# Patient Record
(demographics unavailable — no encounter records)

---

## 2024-10-18 NOTE — HISTORY OF PRESENT ILLNESS
[FreeTextEntry1] : [id number] ALEJANDRO BARRETO Feb 1 1938 [phone]  87 y/o  *CAD-Calcium score 2952-MPI Feb '23 normal perfusion. *RUE prox subclavian stenosis causing 30 mmHg reduction of BP on R arm. *CVA April '22-acute L frontoparielt infarct-***s/p ILR *Carotid disease-mild-50-69% R ICA *HTN *PE, L leg DVT Nov '21 on eliquis, DM, COPD, Parkinson's,  here for followup. slight worsening of dyspnea no chest pain, palpitaitons. son notifies me PCP increased jardiance to 25 mg daily.  BPs well controlled 110s-120s.  LDL  18 HDL 47 TC 81 TG 71  amlodipine 10 mg daily chlorthal. 12.5 mg daily  hydralazine 25 TID bypstolic 20 mg daily jardiance 25 daily eliquis 5 mg BID crestor 40 qhs

## 2024-10-18 NOTE — HISTORY OF PRESENT ILLNESS
[FreeTextEntry1] : [id number] ALEJANDRO BARRETO Feb 1 1938 [phone]  87 y/o  *CAD-Calcium score 2952-MPI Feb '23 normal perfusion. *RUE prox subclavian stenosis causing 30 mmHg reduction of BP on R arm. *CVA April '22-acute L frontoparielt infarct-***s/p ILR *Carotid disease-mild-50-69% R ICA *HTN *PE, L leg DVT Nov '21 on eliquis, DM, COPD, Parkinson's,  here for followup. slight worsening of dyspnea no chest pain, palpitaitons. son notifies me PCP increased jardiance to 25 mg daily.  BPs well controlled 110s-120s.  LDL  18 HDL 47 TC 81 TG 71  amlodipine 10 mg daily chlorthal. 12.5 mg daily  hydralazine 25 TID bypstolic 20 mg daily jardiance 25 daily eliquis 5 mg BID crestor 40 qhs

## 2024-10-23 NOTE — REASON FOR VISIT
[Follow-Up - From Hospitalization] : a follow-up visit after a recent hospitalization [Cough] : cough [Spouse] : spouse [Formal Caregiver] : formal caregiver

## 2024-10-23 NOTE — PHYSICAL EXAM
[TextEntry] : Constitutional: no acute distress, sitting on wheelchair, coughing   HEENT: normal oropharynx, Mallampati Class: III  Neck: normal appearance, no neck mass  Cardiac: normal rate/rhythm, normal s1, s2  Pulmonary: no resp distress, clear to auscultation bilaterally, no r/r/w Chest: no abnormalities Musculoskeletal: normal gait  Extremities: no clubbing, no cyanosis, no edema  Skin: normal color/ pigmentation  Psychiatric: oriented x3, normal affect

## 2024-10-23 NOTE — HISTORY OF PRESENT ILLNESS
[TextBox_4] : The patient was seen by Dr. Hernandez in past who has recently retired. Hence I am seeing  the patient. This is a new patient visit for me. I reviewed the hospital notes as well.  Recently, the patient experienced a decline in their respiratory status, leading to a hospital admission from October 14 to October 21, 2024, due to a COVID-19 infection. During the hospitalization he was treated with Remdesivir and Decadran.   Since discharge, the patient has been experiencing increased cough and difficulty expectorating phlegm. The cough is sometimes productive, with clear to yellow sputum, but the patient finds it challenging to expel the sputum consistently. The patient notes the onset of the cough after the COVID-19 diagnosis, with worsening symptoms since Tuesday, October 22, 2024.  The patient reports significant dyspnea, requiring rest after walking approximately 50 feet. The patient also mentioned an improvement in symptoms when propped up during sleep, which reduced nocturnal coughing. There is no history of receiving antibiotics post-discharge, although antibiotics were administered during the hospital stay. Current medications include Mucinex taken twice daily, Spiriva, and Flovent.   Relevant Medications: - Mucinex (guaifenesin) 600 mg twice daily - Spiriva (tiotropium) - Flovent (fluticasone) - Eliquis (apixaban) for DVT and PE  Past Medical History: - Chronic Obstructive Pulmonary Disease (COPD) - Emphysema - Parkinson's disease - Diabetes mellitus - Hypertension - Hypercholesterolemia - History of pulmonary embolism   Review of Systems: - Respiratory: Increased cough with difficulty expectorating phlegm, shortness of breath with exertion.

## 2024-10-23 NOTE — RESULTS/DATA
[TextEntry] :  PFT 10/23/2024 Spirometry (FEV1):  65(%predicted) Ratio: 55 Lung volumes (TLC):   79 (% predicted) Diffusion capacity (DLCO):  36(% predicted) No significant bronchodilator response   Interpretation: Mild obstructive ventilatory defect, minimally diminished restrictive ventilatory defect, severely reduced diffusion capacity.  ACC: [id number]     EXAM:  XR CHEST PORTABLE URGENT 1V   ORDERED BY: ISHA LIZARRAGA  PROCEDURE DATE:  10/14/2024    INTERPRETATION:  AP chest on October 14, 2024 at 1:37 PM. Patient has sepsis.  Heart size normal. Left loop recorder again noted.  Slight linear scar right midlung again noted. There may be right upper lobe emphysema.  Chest is similar to August 29 this year.  IMPRESSION: Stable findings as above.  --- End of Report ---      JITENDRA PEÑALOZA MD; Attending Radiologist This document has been electronically signed. Oct 14 2024  2:05PM

## 2024-10-23 NOTE — PLAN
[TextEntry] : The patient is an 85 yo male with a past medical history of COPD, emphysema, and recent COVID-19 infection, presenting for evaluation of respiratory symptoms.  - Recent COVID-19 Infection and Suspected Acute Bacterial Bronchitis: - The patient experienced a recent COVID-19 infection with subsequent cough and sputum production. He is s/p hospitalization with Remdesivir and Decadran treatment. Post discharge complaining of greening/ brown expectoration. - The plan includes a course of Levaquin to treat suspected bacterial bronchitis. - Recommended supportive care with steam inhalation and increased fluid intake to help loosen and clear mucus. - Prescribed Tessalon Perles for cough suppression.   - COPD GOLD 2B stage: - The patient has a history of COPD, with current symptoms of exertional dyspnea and productive cough. The PFT shows an FEV1 of 65%, indicating moderate obstruction (GOLD 2)B - Continue Spiriva and Flovent to manage COPD. - Encouraged pulmonary rehabilitation and advised continuation of current inhaler therapy. - May need to transition to Stiolto once more stable.    Follow up: Schedule a follow-up appointment in 2-3 weeks or sooner if symptoms worsen.

## 2024-12-18 NOTE — PHYSICAL EXAM
[de-identified] : well appearing in wheelchair [de-identified] : wnl [FreeTextEntry1] :  Brachial: right 0 and left 2+. Radial: right 0 and left 2+. Posterior tibialis: right 2+ and left 2+. Dorsalis pedis: right 2+ and left 2+. weakened  strenght right hand stable

## 2024-12-18 NOTE — ASSESSMENT
[FreeTextEntry1] : 85yo male with multiple medical problems here for routine follow up of right subclavian stenosis also with finding os aortic stenosis and SMA stenosis these are chronic and asymptomatic reviewed imaging (CTA) and discussed with patient and family; regard duplex stable subclavian stenosis no change in management follow up in 6 months for carotid duplex right arm precautions  -continue antiplatelet; only LEFT arm for BP; hand exercises -f/u 6 months with carotid duplex

## 2024-12-18 NOTE — HISTORY OF PRESENT ILLNESS
[FreeTextEntry1] : *CAD-Calcium score 2952-MPI Feb '23 normal perfusion. *RUE prox subclavian stenosis causing 30 mmHg reduction of BP on R arm. *CVA April '22-acute L frontoparielt infarct s/p ILR *Carotid disease-mild-50-69% R ICA *PE, L leg DVT Nov '21 on eliquis *HTN *DM, COPD, Parkinson's,  1/10/24: Here for routine follow-up of bilateral very mild carotid stenosis and severe right subclavian artery stenosis overall doing well. He states that he has no neurologic symptoms such as CVA TIA amaurosis fugax unilateral weakness off of his baseline and slurred speech. He has Parkinson's though he has slight decline secondary to that. He is compliant with all his medications including anticoagulant though he does have some skin tears and some bruising from it. He states that his right hand has a sensation coolness and weakness but this is his baseline and has not worsened over the last year. He also notes bilateral lower extremity swelling which is worsened towards the end of the day as well as bilateral lower extremity heaviness. He denies any previous leg issues though in reviewing his chart he has a history of a left leg DVT. He otherwise wise has no complaints.  Interval History: Doing well no fevers no chills pain in the RLE improved. Tolerated unna boot dressing.    Interval History: States he's feeling well but endorsing recent "dizzy" spells per wife; denies loss of consciousness or no neurologic symptoms such as CVA TIA amaurosis fugax unilateral weakness off of his baseline and slurred speech. Denies UE pain or swelling bilaterally but complaining right arm is still cold although it has not worsened. He is compliant with all medications; remains concerned about upper extremity skin tears. Denies any new wounds. [de-identified] : Since last visit had a fall that required a long inpatient stay and multiple imaging and rehab stay as well overall improving in wheelchair at this point fall was mechanical not secondary to syncope or imbalance.  In the imaging patient had findings of SMA stenosis possible occlusion heavily calcified vessel along with aortic atherosclerosis that was significant however in comparing his most recent CT imaging from November 2024 2 studies from 2022 these findings are chronic and unchanged.  In regards to his subclavian stenosis he has no right-sided pain numbness and tissue loss.  He has no TIA CVA or amaurosis symptoms as well.

## 2024-12-20 NOTE — HISTORY OF PRESENT ILLNESS
[Spouse] : spouse [Formal Caregiver] : formal caregiver [FreeTextEntry1] : F/U post hospitalization and [facility] stay [de-identified] : 86M w/ PMHx of Parkinsons Disease, CVA, COPD, HTN, HLD, Antiphospholipid antibody positive, PE/DVT on Eliquis and ASA, CAD, presents to office for follow up after hospital discharge and rehab stay for mechanical fall and pneumonia.   Pt was hospitalized at  from 11/12/24-11/14/24 after a mechanical fall and was found to have pneumonia, he was treated with antibiotics. Imaging did not show acute fracture, but patient discharged to [facility] for ongoing right hip pain.   Pt was treated at [facility] x 4 weeks, right hip pain has resolved, but he now c/o left lower back pain that radiates to his anterior thigh. Pt is receiving physical therapy at home and has the help of a home health aide.   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 Cespedes). On review of Dr. Cespedes's notes, the SMA occlusion is chronic, dating back to 2022 and is unchanged. The patient has no abdominal complaints. His appetite is fair.

## 2024-12-20 NOTE — REVIEW OF SYSTEMS
[Recent Change In Weight] : ~T recent weight change [Vision Problems] : vision problems [Hearing Loss] : hearing loss [Postnasal Drip] : postnasal drip [Dyspnea on Exertion] : dyspnea on exertion [Muscle Pain] : muscle pain [Back Pain] : back pain [Unsteady Walk] : ataxia [Memory Loss] : memory loss [Easy Bleeding] : easy bleeding [Easy Bruising] : easy bruising [Negative] : Gastrointestinal [Earache] : no earache [Nosebleeds] : no nosebleeds [Nasal Discharge] : no nasal discharge [Sore Throat] : no sore throat [Hoarseness] : no hoarseness [Chest Pain] : no chest pain [Palpitations] : no palpitations [Lower Ext Edema] : no lower extremity edema [Orthopena] : no orthopnea [Shortness Of Breath] : no shortness of breath [Wheezing] : no wheezing [Cough] : no cough [Dysuria] : no dysuria [Hematuria] : no hematuria [Joint Pain] : no joint pain [Itching] : no itching [Skin Rash] : no skin rash [Headache] : no headache [Dizziness] : no dizziness [Swollen Glands] : no swollen glands [FreeTextEntry2] : loss [FreeTextEntry6] : mild/chronic [FreeTextEntry9] : left sided

## 2024-12-20 NOTE — PHYSICAL EXAM
[No Acute Distress] : no acute distress [Chronically Ill] : chronically ill [Normal Sclera/Conjunctiva] : normal sclera/conjunctiva [EOMI] : extraocular movements intact [Normal Outer Ear/Nose] : the outer ears and nose were normal in appearance [Normal Oropharynx] : the oropharynx was normal [Normal TMs] : both tympanic membranes were normal [No JVD] : no jugular venous distention [No Lymphadenopathy] : no lymphadenopathy [Supple] : supple [No Respiratory Distress] : no respiratory distress  [No Accessory Muscle Use] : no accessory muscle use [Clear to Auscultation] : lungs were clear to auscultation bilaterally [Normal Rate] : normal rate  [Regular Rhythm] : with a regular rhythm [Normal S1, S2] : normal S1 and S2 [Pedal Pulses Present] : the pedal pulses are present [No Edema] : there was no peripheral edema [Soft] : abdomen soft [Normal Anterior Cervical Nodes] : no anterior cervical lymphadenopathy [No Spinal Tenderness] : no spinal tenderness [No Joint Swelling] : no joint swelling [Grossly Normal Strength/Tone] : grossly normal strength/tone [No Rash] : no rash [No Focal Deficits] : no focal deficits [Normal Gait] : normal gait [Normal Affect] : the affect was normal [Alert and Oriented x3] : oriented to person, place, and time [Normal Insight/Judgement] : insight and judgment were intact

## 2024-12-20 NOTE — PLAN
[FreeTextEntry1] : #Hospital DC F/U / Pneumonia / Hip Pain 2/2 Mechanical Fall #Anemia #Back Pain w/ Radiculopathy #SMA Occlusion, chronic  Hospital imaging, labs, and notes personally reviewed CBC, CMP, A1C, Iron studies, B12, and Folate  Referral to ortho/spine for pain management Continue home PT Close f/u with vascular surgery, cardiology, and pulmonology Repeat CT chest in 3 weeks to assess for resolution of pneumonia   High dose influenza vaccine administered today F/U in Feb for CPE

## 2025-01-14 NOTE — HISTORY OF PRESENT ILLNESS
[FreeTextEntry1] : [id number]  ALEJANDRO BARRETO  Feb 1 1938 [phone]   85 y/o  *CAD-Calcium score 2952-MPI Feb '23 normal perfusion.   LCx 195 RCA 1230 *RUE prox subclavian stenosis causing 30 mmHg reduction of BP on R arm. *CVA April '22-acute L frontoparielt infarct-***s/p ILR *Carotid disease-mild-50-69% R ICA *HTN *PE, L leg DVT Nov '21 on eliquis, DM, COPD, Parkinson's,  here for followup. was in hospital after fall went to [facility] for rehab for 5 days. also persistent ough.  amlodipine 10 mg daily ASA n81 mg daily chlorthalidone 25 tabs 1/2 tab daily eliuqiw 5 mg BID zetia 10 mg qhs hyralazsine 10 BID jardianc 25 daily nebivolol 20 mg daily crestor 40 qhs  Sep '24 LDL 18
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
## 2025-02-03 NOTE — DATA REVIEWED
[de-identified] : MRI brain 11/27/2019: No acute abnormalities. There are a few scattered small foci of T2/FLAIR hyperintensity in the periventricular subcortical white matter, suggestive of minimal chronic microvascular ischemic changes.\par  \par  MRI brain 4/12/22:\par   [de-identified] : 24 hour EEG 11/28/19: Normal [de-identified] : CTA head, CTA neck, CT perfusion 4/11/22:\par  \par  CT PERFUSION: No regional perfusion abnormality.\par  \par  CTA BRAIN: Atheromatous irregularity and mild stenoses along the \par  cavernous and clinoid segments of the ICA bilaterally. Otherwise, no \par  flow-limiting stenosis or occlusion.\par  \par  CTA NECK: Moderate right ICA origin stenosis due to heavily calcified \par  plaque.\par  \par  Mild left ICA origin stenosis due to calcified plaque.\par  \par  Focal right vertebral artery origin stenosis due to heavily calcified \par  plaque.\par  \par

## 2025-02-03 NOTE — DISCUSSION/SUMMARY
[FreeTextEntry1] : Mr. Sears is an 87 year old left handed man with parkinsonism.  He was hospitalized in November 2020 for workup of stereotypical episodes of fatigue preceded by a funny smell and tingling sensation in his upper body. MRI brain and 24 hour EEG were unremarkable.  In November 2021 he was found to have DVTs and a PE. He was hospitalized in April 2022 with a left frontal parietal stroke.  He has had multiple recent hospitalizations for pneumonia.   Parkinson's Disease -PD with mild tremor and bradykinesia -He had headaches in the past while taking Sinemet at a dose of 25/100 TID initially. -At this time he is tolerating Sinemet other than dry mouth (which may also be related to amitriptyline) -He had had some progression but does not want to increase the dose. -Continue Sinemet 25/100, 1 tab at 9 AM, 1:30 Pm and 6 PM. Can add a fourth dose if needed. -Attempt to increase exercise, currently limited by dyspnea.  -Adequate fluid intake to prevent orthostatic dizziness.   REM Behavior Disorder -His wife reports episodes of flailing, -His wife is now sleeping in a different bedroom.  -Can use melatonin prn -Safe bedroom environment  Stroke: -Cryptogenic left frontal-parietal infarct. No evidence of occult malignancy seen. -Continue Eliquis + aspirin -Continue statin -Return to hospital with any stroke like symptoms.   Cognitive Impairment -He scored 23/30 on the MoCA in May 2023 suggestive of mild cognitive dysfunction. - We discussed the importance of staying mentally and physically active. -Mediterranean diet  Mood disturbance -Continue sertraline 50 mg/day (if he is taking less, can cut in half)   f/u 6 months, sooner if needed.
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
## 2025-02-03 NOTE — DATA REVIEWED
[de-identified] : MRI brain 11/27/2019: No acute abnormalities. There are a few scattered small foci of T2/FLAIR hyperintensity in the periventricular subcortical white matter, suggestive of minimal chronic microvascular ischemic changes.\par  \par  MRI brain 4/12/22:\par   [de-identified] : 24 hour EEG 11/28/19: Normal [de-identified] : CTA head, CTA neck, CT perfusion 4/11/22:\par  \par  CT PERFUSION: No regional perfusion abnormality.\par  \par  CTA BRAIN: Atheromatous irregularity and mild stenoses along the \par  cavernous and clinoid segments of the ICA bilaterally. Otherwise, no \par  flow-limiting stenosis or occlusion.\par  \par  CTA NECK: Moderate right ICA origin stenosis due to heavily calcified \par  plaque.\par  \par  Mild left ICA origin stenosis due to calcified plaque.\par  \par  Focal right vertebral artery origin stenosis due to heavily calcified \par  plaque.\par  \par

## 2025-02-03 NOTE — PHYSICAL EXAM
[FreeTextEntry1] : Examination: Constitutional: normal, no apparent distress Eyes: normal conjunctiva b/l, no ptosis, visual fields full oropharynx: low lying soft palate Respiratory: no respiratory distress, normal effort, normal auscultation Cardiovascular: normal rate, rhythm, no murmurs Neck: supple, no masses Vascular: carotids normal Skin: normal color, no rashes Psych: normal mood, affect  Neurological: Memory: oriented to person, place, time Language intact/no aphasia Cranial Nerves: II-XII intact, Pupils equally round and reactive to light, ocular muscles/movements intact, no ptosis, no facial weakness, tongue protrudes normally in the midline,  Motor: normal tone, no pronator drift, full strength in all four extremities in the proximal and distal muscle groups Coordination: No significant bradykinesia, Mild increased tone at right wrist, mild intermittent rest tremor in b/l hands. Decreased amplitude and precision of finger tap on left. No dysmetria on finger nose finger Mild tremor in jaw Sensory: intact to light touch, vibration, joint position sense DTRs: symmetric, 2 in b/l triceps, 2 in b/l biceps, 2 in b/l brachioradialis, 2 in bilateral patellars, 1 in bilateral Achilles, Babinskis negative bilaterally Gait: slightly wide based, good stride length, no shuffling

## 2025-02-28 NOTE — HISTORY OF PRESENT ILLNESS
[Family Member] : family member [Formal Caregiver] : formal caregiver [FreeTextEntry1] : Annual Wellness Exam [de-identified] : 87M w/ PMHx of Parkinsons Disease, CVA, COPD, chronic hypoxemic respiratory failure requiring supplemental oxygen PRN, pHTN, former smoker, HTN, HLD, Antiphospholipid antibody positive, PE/DVT on Eliquis and ASA, CAD, presents to office for an annual wellness examination.   Pt was hospitalized at  from 1/19/25-1/27/25 for acute hypoxic respiratory failure 2/2 HAP, mild COPD exacerbation, b/l pleural effusions and bronchitis.

## 2025-02-28 NOTE — REVIEW OF SYSTEMS
[Vision Problems] : vision problems [Hearing Loss] : hearing loss [Postnasal Drip] : postnasal drip [Dyspnea on Exertion] : dyspnea on exertion [Joint Pain] : joint pain [Unsteady Walk] : ataxia [Memory Loss] : memory loss [Easy Bleeding] : easy bleeding [Easy Bruising] : easy bruising [Negative] : Psychiatric [Earache] : no earache [Nosebleeds] : no nosebleeds [Nasal Discharge] : no nasal discharge [Sore Throat] : no sore throat [Hoarseness] : no hoarseness [Chest Pain] : no chest pain [Palpitations] : no palpitations [Lower Ext Edema] : no lower extremity edema [Orthopena] : no orthopnea [Paroxysmal Nocturnal Dyspnea] : no paroxysmal nocturnal dyspnea [Shortness Of Breath] : no shortness of breath [Wheezing] : no wheezing [Cough] : no cough [Dysuria] : no dysuria [Hematuria] : no hematuria [Joint Stiffness] : no joint stiffness [Muscle Pain] : no muscle pain [Muscle Weakness] : no muscle weakness [Back Pain] : no back pain [Joint Swelling] : no joint swelling [Itching] : no itching [Skin Rash] : no skin rash [Headache] : no headache [Dizziness] : no dizziness [Fainting] : no fainting [Confusion] : no confusion [Swollen Glands] : no swollen glands [FreeTextEntry6] : mild/chronic [FreeTextEntry9] : bilateral knees improved

## 2025-02-28 NOTE — PHYSICAL EXAM
[No Acute Distress] : no acute distress [Chronically Ill] : chronically ill [Normal Sclera/Conjunctiva] : normal sclera/conjunctiva [EOMI] : extraocular movements intact [Normal Outer Ear/Nose] : the outer ears and nose were normal in appearance [Normal Oropharynx] : the oropharynx was normal [Normal TMs] : both tympanic membranes were normal [No JVD] : no jugular venous distention [No Lymphadenopathy] : no lymphadenopathy [Supple] : supple [No Respiratory Distress] : no respiratory distress  [No Accessory Muscle Use] : no accessory muscle use [Clear to Auscultation] : lungs were clear to auscultation bilaterally [Normal Rate] : normal rate  [Regular Rhythm] : with a regular rhythm [Normal S1, S2] : normal S1 and S2 [Pedal Pulses Present] : the pedal pulses are present [No Edema] : there was no peripheral edema [Soft] : abdomen soft [Non Tender] : non-tender [Non-distended] : non-distended [Normal Bowel Sounds] : normal bowel sounds [Normal Anterior Cervical Nodes] : no anterior cervical lymphadenopathy [No CVA Tenderness] : no CVA  tenderness [No Spinal Tenderness] : no spinal tenderness [No Joint Swelling] : no joint swelling [Grossly Normal Strength/Tone] : grossly normal strength/tone [No Rash] : no rash [No Focal Deficits] : no focal deficits [Normal Affect] : the affect was normal [Alert and Oriented x3] : oriented to person, place, and time [Normal Insight/Judgement] : insight and judgment were intact

## 2025-02-28 NOTE — PLAN
[FreeTextEntry1] :    #Annual Wellness Examination / Health Maintenace  Complementary labs collected here in office today, due for comprehensive labs in September Annual dermatology, ophthalmology, and dental exams No longer getting colonoscopies Vaccines as scheduled ADA diet and exercise as tolerated f/u in September   #COPD #Chronic hypoxemic respiratory failure #pHTN Continue supplemental oxygen 2L via nasal cannula on exertion and at bedtime, keep SpO2 > 88% Continue Spiriva, Advair, and Albuterol as needed F/U with Dr. Osborne q 6 months   #DM2 Check A1C  Continue Jardiance to 25mg daily ADA diet  #HTN #HLD  #CVA #CAD Continue Crestor, Aspirin, and Zetia  Continue Amlodipine, Chlorthalidone, Hydralazine, and Bystolic Routine follow up with cardiology and vascular surgery  #Parkinson's Disease Continue Zoloft and Sinemet Routine follow up with neurology  #BPH Continue Finasteride and Flomax F/U routinely with urology

## 2025-02-28 NOTE — HEALTH RISK ASSESSMENT
[No] : In the past 12 months have you used drugs other than those required for medical reasons? No [0] : 2) Feeling down, depressed, or hopeless: Not at all (0) [PHQ-2 Negative - No further assessment needed] : PHQ-2 Negative - No further assessment needed [Patient reported colonoscopy was normal] : Patient reported colonoscopy was normal [Very Good] : ~his/her~  mood as very good [Intercurrent ED visits] : went to ED [Intercurrent hospitalizations] : was admitted to the hospital  [Intercurrent Urgi Care visits] : went to urgent care [Any fall with injury in past year] : Patient reported fall with injury in the past year [Former] : Former [20 or more] : 20 or more [> 15 Years] : > 15 Years [NO] : No [HIV test declined] : HIV test declined [Hepatitis C test declined] : Hepatitis C test declined [With Significant Other] : lives with significant other [# of Members in Household ___] :  household currently consist of [unfilled] member(s) [Retired] : retired [] :  [# Of Children ___] : has [unfilled] children [Feels Safe at Home] : Feels safe at home [Independent] : using telephone [Some assistance needed] : managing finances [Reports changes in vision] : Reports changes in vision [Reports normal functional visual acuity (ie: able to read med bottle)] : Reports normal functional visual acuity [Reports changes in dental health] : Reports changes in dental health [FreeTextEntry1] : worsening of Parkinson's disease  [de-identified] : cardiology, pulmonology, neurology, urology, vascular  [Audit-CScore] : 0 [de-identified] : PT/OT, walking  [de-identified] : regular  [[id number]] : 0 [EyeExamDate] : upcoming appointment  [Change in mental status noted] : No change in mental status noted [Handling Complex Tasks] : difficulty handling complex tasks [Reports changes in hearing] : Reports no changes in hearing [ColonoscopyComments] : couple years ago [de-identified] : broken tooth

## 2025-06-19 NOTE — PHYSICAL EXAM
[FreeTextEntry1] : General Appearance: well appearing in wheelchair.   HEENT: wnl. Brachial: right 0 and left 2+. Radial: right 0 and left 2+. Posterior tibialis: right 2+ and left 2+. Dorsalis pedis: right 2+ and left 2+. weakened  strenght right hand stable.

## 2025-06-19 NOTE — HISTORY OF PRESENT ILLNESS
[FreeTextEntry1] : 1/10/24: Here for routine follow-up of bilateral very mild carotid stenosis and severe right subclavian artery stenosis overall doing well. He states that he has no neurologic symptoms such as CVA TIA amaurosis fugax unilateral weakness off of his baseline and slurred speech. He has Parkinson's though he has slight decline secondary to that. He is compliant with all his medications including anticoagulant though he does have some skin tears and some bruising from it. He states that his right hand has a sensation coolness and weakness but this is his baseline and has not worsened over the last year. He also notes bilateral lower extremity swelling which is worsened towards the end of the day as well as bilateral lower extremity heaviness. He denies any previous leg issues though in reviewing his chart he has a history of a left leg DVT. He otherwise wise has no complaints.  Interval History: Doing well no fevers no chills pain in the RLE improved. Tolerated unna boot dressing.   Interval History: States he's feeling well but endorsing recent "dizzy" spells per wife; denies loss of consciousness or no neurologic symptoms such as CVA TIA amaurosis fugax unilateral weakness off of his baseline and slurred speech. Denies UE pain or swelling bilaterally but complaining right arm is still cold although it has not worsened. He is compliant with all medications; remains concerned about upper extremity skin tears. Denies any new wounds.    Interval History: Since last visit had a fall that required a long inpatient stay and multiple imaging and rehab stay as well overall improving in wheelchair at this point fall was mechanical not secondary to syncope or imbalance. In the imaging patient had findings of SMA stenosis possible occlusion heavily calcified vessel along with aortic atherosclerosis that was significant however in comparing his most recent CT imaging from November 2024 2 studies from 2022 these findings are chronic and unchanged. In regards to his subclavian stenosis he has no right-sided pain numbness and tissue loss. He has no TIA CVA or amaurosis symptoms as well. [de-identified] : doing well since last visit no neurologic events such as TMB, TIA, or CVA. no issues with arm pain no dizziness. PD symptoms worsening feels like voice changing and worsened gait

## 2025-07-02 NOTE — HISTORY OF PRESENT ILLNESS
[Never] : never [TextBox_4] : Chief complaint History of COPD and oxygen dependence History of parkinsonism The patient presented with persistent shortness of breath, particularly on exertion, and a concern about oxygen therapy management for COPD.  SUBJECTIVE: The patient reported persistent shortness of breath that had not improved or worsened since the last visit, despite using inhalers such as Fluticasone and Spiriva. The patient had been off supplemental oxygen for two months and experienced breathlessness after activities such as walking to the bathroom. The patient used a walker for mobility and noted that breathlessness resolved with rest. The patient did not report low oxygen levels during resting periods. The patient was concerned about the noise of the oxygen concentrator at night and had stopped using it due to this. The patient had a history of COPD and prior pneumonia hospitalization, as well as diabetes managed with Jardiance. The patient also reported a tremor in one hand and took medications including Eloquis and aspirin.  No productive cough hemoptysis fever or chills. No recent upper or lower respiratory tract infection. Patient denies witnessed aspiration. Overall he has improved since most recent hospitalization for pneumonia  Patient appeared well-nourished and alert. Musculoskeletal: Mild swelling and redness noted on the leg with a history of trauma. Integumentary: Indurated, swollen, painful area on the leg with discoloration and skin breakdown, possibly indicating cellulitis.

## 2025-07-02 NOTE — PHYSICAL EXAM
[No Acute Distress] : no acute distress [Well Nourished] : well nourished [Well Developed] : well developed [Normal Oropharynx] : normal oropharynx [II] : Mallampati Class: II [Normal Appearance] : normal appearance [Supple] : supple [No Neck Mass] : no neck mass [No JVD] : no jvd [Normal Rate/Rhythm] : normal rate/rhythm [Normal Pulses] : normal pulses [Normal S1, S2] : normal s1, s2 [No Murmurs] : no murmurs [No Resp Distress] : no resp distress [No Acc Muscle Use] : no acc muscle use [Clear to Auscultation Bilaterally] : clear to auscultation bilaterally [No Clubbing] : no clubbing [No Cyanosis] : no cyanosis [1+ Pitting] : 1+ pitting [Tenderness] : tenderness [Normal Color/ Pigmentation] : normal color/ pigmentation [Oriented x3] : oriented x3 [Normal Insight/judgment] : normal insight/judgment [Normal Affect] : normal affect [TextBox_99] : Currently on wheelchair [TextBox_105] : Small area of induration On left lower extremity [TextBox_132] : Generalized tremor. Motor power generally decreased

## 2025-07-02 NOTE — PROCEDURE
[FreeTextEntry1] : Pulmonary function test showed mild obstruction. Mild restriction. Severe reduction in DLCO

## 2025-07-02 NOTE — REVIEW OF SYSTEMS
[Fever] : no fever [Fatigue] : fatigue [Recent Wt Gain (___ Lbs)] : ~T no recent weight gain [Chills] : no chills [Recent Wt Loss (___ Lbs)] : ~T recent [unfilled] lb weight loss [Dry Eyes] : no dry eyes [Ear Disturbance] : no ear disturbance [Epistaxis] : no epistaxis [Sore Throat] : no sore throat [Eye Irritation] : no eye irritation [Nasal Congestion] : no nasal congestion [Dry Mouth] : no dry mouth [Sinus Problems] : no sinus problems [Mouth Ulcers] : no mouth ulcers [Poor Dentition] : poor dentition [Edentulous] : no edentulous [Cough] : no cough [Hemoptysis] : no hemoptysis [Chest Tightness] : no chest tightness [Frequent URIs] : no frequent URIs [Sputum] : no sputum [Dyspnea] : dyspnea [Pleuritic Pain] : no pleuritic pain [Wheezing] : no wheezing [A.M. Dry Mouth] : no a.m. dry mouth [SOB on Exertion] : sob on exertion [Chest Discomfort] : no chest discomfort [Claudication] : no claudication [Edema] : no edema [Leg Cramps] : no leg cramps [Orthopnea] : no orthopnea [Syncope] : no syncope [Hay Fever] : no hay fever [Seasonal Allergies] : seasonal allergies [Nasal Discharge] : no nasal discharge [Immunocompromised] : not immunocompromised [GERD] : gerd [Abdominal Pain] : no abdominal pain [Nausea] : no nausea [Vomiting] : no vomiting [Diarrhea] : no diarrhea [Food Intolerance] : no food intolerance [Nocturia] : no nocturia [Frequency] : dysuria [Urgency] : no frequency [Myalgias] : myalgias [Back Pain] : no back pain [Fracture] : no fracture [Trauma/ Injury] : trauma/ injury [Raynaud] : no raynaud [Rash] : rash [Ulcerations] : no ulcerations [Telangiectasias] : no telangiectasias [Anemia] : anemia [Blood Transfusion] : no blood transfusion [Easy Bruising] : easy bruising [Clotting Disorder/ Frequent bleeding] : clotting disorder/ frequent bleeding [Headache] : no headache [Focal Weakness] : focal weakness [Seizures] : no seizures [Head Injury] : no head injury [Dizziness] : no dizziness [Tremor] : no tremor [Paralysis] : no paralysis [Confusion] : confusion [Involuntary Movements] : involuntary movements [Depression] : no depression [Anxiety] : no anxiety [Panic Attacks] : no panic attacks [Obesity] : no obesity

## 2025-07-02 NOTE — ASSESSMENT
[FreeTextEntry1] : 87 years old male with history of COPD, With prior history of COVID infection followed by second hospitalization for pneumonia and shortness of breath requiring hospitalization. On his CT scan he had Bilateral atelectasis and history of mild pulmonary hypertension and chronic heart failure returns for follow-up. He is on nocturnal oxygen supplementation. He has exertional dyspnea.  - COPD GOLD 2B stage: - The patient has a history of COPD, with current symptoms of exertional dyspnea and productive cough. The PFT shows an FEV1 of 65%, indicating moderate obstruction (GOLD 2)B   ASSESSMENT: 1. Chronic Obstructive Pulmonary Disease (COPD): The patient has a diagnosis of COPD, managed with inhalers such as Fluticasone and Spiriva. Despite treatment, the patient continues to experience shortness of breath, especially with exertion. The inhalers aim to reduce lung inflammation and improve lung function, though the persistent symptomatology suggests that other factors may contribute to the breathlessness.  2. Possible Cellulitis on the Leg: The patient presented with a swollen, painful, and discolored area on the leg, possibly due to cellulitis. The area was warm and indurated, suggesting an infection, which is concerning given the patient's diabetes, a condition that increases the risk of soft tissue infections and complicates healing.  PLAN:  Treatment: - Medications: Continued the use of Fluticasone and Spiriva inhalers. Prescribed Doxycycline 75mg daily for 6 days for suspected cellulitis. - Oxygen: Advised the patient to use oxygen at night and during exertion.  Tests: - Planned a chest X-ray in three months to assess lung condition.  Patient Education: - Educated the patient about the function of their COPD medications and the importance of oxygen therapy during physical exertion and at night. - Advised on managing the infection site with antibiotics and monitoring for changes.  Follow-Up: - Scheduled follow-up with primary care (Dr. Miller) in three months for a chest X-ray and review. - Advised the patient to follow up with a cardiologist regarding the dosage of Eloquis and aspirin.  Disposition: - The patient was advised to gradually increase physical activity with appropriate oxygen use and to follow the prescribed medication regimen for cellulitis.

## 2025-07-02 NOTE — REASON FOR VISIT
[Follow-Up] : a follow-up visit [COPD] : COPD [Shortness of Breath] : shortness of breath [Family Member] : family member [Formal Caregiver] : formal caregiver

## 2025-07-29 NOTE — ASSESSMENT
[FreeTextEntry1] : A/P:  -cont. current meds -refer to EP for ILR check no in person check in > 1 yr. -return 6 months LFT, FLP prior to visit.

## 2025-07-29 NOTE — HISTORY OF PRESENT ILLNESS
[FreeTextEntry1] : [id number]  ALEJANDRO BARRETO  Feb 1 1938 [phone] 87 y/o  *CAD-Calcium score 2952-MPI Feb '23 normal perfusion.   LCx 195 RCA 1230 *RUE prox subclavian stenosis causing 30 mmHg reduction of BP on R arm. *CVA April '22-acute L frontoparielt infarct-***s/p ILR *Carotid disease-mild-50-69% R ICA *HTN *PE, L leg DVT Nov '21 on eliquis, DM, COPD, Parkinson's,  here for followup. no chest pain,d yspnea, palpitations, syncope.